# Patient Record
Sex: MALE | Race: WHITE | Employment: FULL TIME | URBAN - METROPOLITAN AREA
[De-identification: names, ages, dates, MRNs, and addresses within clinical notes are randomized per-mention and may not be internally consistent; named-entity substitution may affect disease eponyms.]

---

## 2022-12-06 ENCOUNTER — HOSPITAL ENCOUNTER (OUTPATIENT)
Dept: PHYSICAL THERAPY | Age: 37
Discharge: HOME | End: 2022-12-06
Payer: COMMERCIAL

## 2022-12-06 PROCEDURE — 97162 PT EVAL MOD COMPLEX 30 MIN: CPT

## 2022-12-06 PROCEDURE — 97140 MANUAL THERAPY 1/> REGIONS: CPT

## 2022-12-06 PROCEDURE — 97530 THERAPEUTIC ACTIVITIES: CPT

## 2022-12-06 NOTE — PROGRESS NOTES
[facility]   [address] [facility] [address], [place] [address]  Phone: [phone] Fax: [phone] / STATEMENT OF MEDICAL NECESSITY FOR PHYSICAL THERAPY SERVICES  Patient Name: Nickolas Payan : 1985   Medical   Diagnosis: Other low back pain [M54.59] Treatment Diagnosis: Lumbar derangement   Onset Date: 22     Referral Source: Valdo Nicholson MD [place] [facility]): 2022   Prior Hospitalization: See medical history Provider #: [id number]   Prior Level of Function: 4x severe LBP episodes lasting 2-4 days each; active, lifitng weights, working in [employer]   Comorbidities: scoliosis   Medications: Verified on Patient Summary List   The Plan of Care and following information is based on the information from the initial evaluation.   ========================================================================  Assessment / key information:  Pt is a 43 yo male with another onset of  LBP on 22 when he was picking up a piece of laundry. Pt notes 4x previous episodes of LBP all with flexion/loads with lifting a bench and doing dead lifts. Pain episodes are typically severe, lasting 2-4 days with pt unable to get out of bed and noting sever mm spasms. This episode was 2 days. Treatment in the past has been limited by no insurance. Xrays note Scoliosis (pt with min R rib hump in flexion). Pt presents with  ranging from 2-10/10, Lately pain not as high as 10/10 in the central lumbar spine around L4-5, with intermittent radiations out laterally. Pt denies peripheralization into buttox and legs; denies red flags and denies weakness. Pt has limited extension ROM and PA mobility. AROm lumbar spine is limited in extension and (B) rotation with most rotation attempting to come from the TS. Hip PROM limited in R IR to 15 deg. FlexibilitY: + HS 90/90, + Ely. (-).   Repeated movement testing reveals Decreased pain and increased ROM with extension, sxs consistent with extension bias. (-) Special tests include: SIJ cluster test, CONNOR, SLR (B).  (+) Special tests include: R slump. Functional limitations include: flexion, tall kneeling flexion based activities at work, rotation while on a ladder with fixated feet, sitting > 2 hours, ADLs in the morning (shaving head, brushing teeth).   Pt will benefit from PT interventions to address the aforementioned deficits and allow pt to return to [facility].   ========================================================================  Eval Complexity: History: MEDIUM  Complexity : 1-2 comorbidities / personal factors will impact the outcome/ POC Exam:HIGH Complexity : 4+ Standardized tests and measures addressing body structure, function, activity limitation and / or participation in recreation  Presentation: MEDIUM Complexity : Evolving with changing characteristics  Clinical Decision Making:Other outcome measures FOTO not tested but mod complexity based on PT judgement of pt's dysfunction   MEDIUMOverall Complexity:MEDIUM  Problem List: pain affecting function, decrease ROM, decrease strength, impaired gait/ balance, decrease ADL/ functional abilitiies, decrease activity tolerance, decrease flexibility/ joint mobility, and decrease transfer abilities   Treatment Plan may include any combination of the following: Therapeutic exercise, Neuromuscular reeducation, Manual therapy, Therapeutic activity, Self care/home management, Electric stim unattended , Gait training, and Mechanical traction  Vasopnuematic compression justification:  Per bilateral girth measures taken and listed above the edema is considered significant and having an impact on the patient's strength, balance, gait and transfers  Patient / Family readiness to learn indicated by: asking questions, trying to perform skills, and interest  Persons(s) to be included in education: patient (P)  Barriers to Learning/Limitations: None  Measures taken:    Patient Goal (s): [place] solution that enables me to live my life as I want\"   Patient self reported health status: fair  Rehabilitation Potential: good  Short Term Goals: To be accomplished in  2  treatments:  1. Pt will be independent and compliant with HEP to decrease pain, increase ROM and return pt to PLOF. Status at last assessment: initiated at   Long Term Goals: To be accomplished in  8-12  treatments:  1. Pt will increase score on the FOTO to > or = functional improvement/100 to demo an increase in functional activity tolerance. Status at last assessment: to be assessed NV  2. Pt will be able to self-manage ave pain to < or = 2/10 ave in the lumbar spine to improve ease of posture, mobility and self-care   Status at last assessment: ranges 2-10/10  3. Pt will be maintain pain centralized to the lumbar spine > or = 75% of the time to improve prognosis for recovery. Status at last assessment: radiating laterally often   4. Pt will be able to maintain tall kneeling position for work > or = 30 minutes with no c/o pain to restore PLOF   Status at last assessment: pain with tall kneeling positions at work   5. Pt will have an increase in lumbar extension AROM to > or = 75% in standing to restore end range mobility for maintenance and prevention of pain in future episodes  Status at last: 25% with some end range pain (no worse as result)  Frequency / Duration:   Patient to be seen  2  times per week for 8-12  treatments:  (All LTG as above will be assessed and updated every 10 visits or 30 days and progressed as needed)  Patient / Caregiver education and instruction: self care, activity modification, and exercises  Therapist Signature: Ulysses Menezes DPT Date: 36/2/6591   Certification Period: Na  Time: 8:13 AM   ========================================================================  I certify that the above Physical Therapy Services are being furnished while the patient is under my care.   I agree with the treatment plan and certify that this therapy is necessary. Physician Signature:        Date:       Time:   Dipika Treviño MD  Please sign and return to [facility] or you may fax the signed copy to [phone]. Thank you.

## 2022-12-06 NOTE — PROGRESS NOTES
PT DAILY TREATMENT NOTE     Patient Name: Marisa Silva  Date:2022  : 1985  [x]  Patient  Verified  Payor: Chel Parlor / Plan: [place] / Product Type: Commerical /    In time:9:16  Out time:10:07  Total Treatment Time (min): 51  Total Timed Codes (min): 51  1:1 Treatment Time (min): 51   Visit #: 1 of     Treatment Area: Other low back pain [M54.59]    SUBJECTIVE  Pain Level (0-10 scale):C: 2, B: 2, W: 10  []constant []intermittent [x]improving []worsening []no change since onset  Worsens: bending, pain worse in the evenings after work  Eases: unsure, medication, time    Current symptoms/Complaints:  3 years ago pt notes new onset of back pain when he was cutting the grass. He bent over, lifted a wrought iron bench. Sharp pain for fleeting moment and then severe mm spasms in the lumbar spine through the glutes and HS. Spent 4 days in bed. Treatment: strengthening the core; started lifting, dead lifts 95# to start and progressed to 165#. Noting an episode of pain with dead lifting, the exact same pain as before. Throughout this whole time, pt did not have insurance. [facility] did do imaging and noted scoliosis, which he's had since 4th grade. Pt was able to resume lifting eventually. 3rd episode was deadlifting 145#, 1.5 years ago. 4th episode: bent over while folding laundry. Symptoms were not as severe as the last episodes but have lasted for 2 days. Pt does get pain, no numbness, in the legs during these episodes. Pain radiates out into the glutes.     Mm cramping wakes him at night; he can get cramping in the TS at night after work   Pt notes feeling pretty good when he wakes up but ADLs involving flexion can increase pain  Sittin hours    Subjective functional status/changes:     PLOF: active, weight lifting; no pain management needed prior to first episode  Limitations to PLOF: work activities, weight lifting; bending; soreness and pain after weight lifting. Mechanism of Injury: bending, lifting     Previous Treatment/Compliance: xrays at [facility]; no treatment as pt had no insurance.    PMHx/Surgical Hx: NA  Work Hx: ; must pivot on a ladder with stationary feet; will yield to severe lumbar cramping; tall kneeling at work for several minutes; pt adjusts his positions at work to avoid painful positions; can use 1/2 kneeling positions or supine  Living Situation: not asked today  Pt Goals: \"a solution that enables me to live my life as I want\"  Barriers: []pain []financial []time []transportation []other  Motivation: great to return to his PLOF and weight lifting  Substance use: NV    OBJECTIVE      Vasopnuematic compression justification:  Per bilateral girth measures taken and listed above the edema is considered significant and having an impact on the patient's functional mobility and self-care    NC min Therapeutic Exercise:  [x] See flow sheet :   Rationale: increase ROM and increase strength to improve the patients ability to restore lumbar ROM     8 min Manual Therapy:  PA glides to LS L3-5 in prone, sustained pressure building over 30-45\"; REIL with PT OP    Rationale: decrease pain and increase ROM to reach end range for improved prognosis  The manual therapy interventions were performed at a separate and distinct time from the therapeutic activities interventions  (Na Add 59 Modifier in conjunction with TA treatment)    15 min Therapeutic Activity:  [x]  See flow sheet :    REIL with pt OP  RANJAN  Posture changes every 20-30 min  Centralization  Prognosis  80% LBP returns; managing it in the future  Awareness of stiffness  Avoidance of flexion in first 3 hours of the day    Rationale:  to restore ROM, posture, strength, reduction and prevention of pain         W with TA min Patient Education: [x] Review HEP    [] Progressed/Changed HEP based on:   [] positioning   [] body mechanics   [] transfers   [] heat/ice application Other Objective/Functional Measures:   Physical Therapy Evaluation - Lumbar Spine ([facility])  General Health:  Red Flags Indicated? [] Yes    [x] No  [] Yes [x] No   Recent weight change (If yes, due to dieting?  [] Yes  [] No)            [] Yes [x] No   Weakness in legs during walking (mostly noted w/ walking down the steps, feels that he gets the \"miri legs\")  [] Yes [x] No   Unremitting pain at night  [] Yes [x] No   Abdominal pain or problems (nauease, mostly on the L side where the pain wraps around)  [] Yes [x] No   Rectal bleeding (hemrroids)  [] Yes [x] No   Feet more cold or painful in cold weather  [] Yes [x] No   Blood or pain with urination  [] Yes [x] No   Dysfunction of bowel or bladder (after pee, has had to \"milk the urethra\", intermittent dribbles)  [] Yes [x] No   Recent illness within past 3 weeks (i.e, cold, flu)  [] Yes [x] No   Numbness/tingling in buttock/genitalia region     Functional Status  Prior level of function: indep  Present functional limitations: see above  What position do you sleep in?: SL     OBJECTIVE  Posture:  Lateral Shift:    [] R    [] L      [] +  [x] -   Lordosis:         [] Increased   [x] Decreased   [] WNL  Pelvic symmetry: [x] WNL    in standing and supine       Innominate rotation: none noted; rib hump on R with standing flexion     Gait: guarded, lacking full rotation in the pelvis e     Active Movements: [] N/A   [] Too acute   [] Other:  ROM % AROM % PROM Comments:pain, area   Forward flexion 40-60 To toes    Min R Rib hump   Extension 20-30 25%    Central pain, no worse as result   SB right 20-30 To knee  No change   SB left 20-30 To knee   No change   Rotation right 5-10 50%    most rotation in TS   Rotation left 5-10 50%    most rotation in the TS      Prone R hip IR; 15 deg, L WNL     Repeated Movements   Effects on present pain: produces (CT), abolishes (A), increases (incr), decreases (decr), centralizes (C), peripheral (PH), no effect (NE)    Pre-Test Sx Flexion Repeated Flexion Extension Repeated Extension Repeated SBL Repeated SBR   Sitting                 Standing   NE        Lying      PH of pain      Comments:  Side Glide: NT     Neuro Screen [x] WNL     Sensation: intact to light touch throughout dermatomal pattern   Reflexes: Ankle clonus:   Babinski:      Dural Mobility:  Slump Test:  + R   SLR: L -, R min +  Prone Knee Bend:      [x] R    [x] L    [] +    [x] -      Palpation  [x] Min  [x] Mod  [] Severe    Location: L3-5,   Restrictions in PA mobility at T7-9        Strength    L(0-5) R (0-5) N/T   Hip Flexion (L1,2) 5 5 []    Knee Extension (L3,4) 5 5 []    Ankle Dorsiflexion (L4)   []    Great Toe Extension (L5)   []    Ankle Plantarflexion (S1)   []    Knee Flexion (S1,2) 5 5 []    Gluteus Luiz  4 4  []    Glute med 4+ 4+  []     COMMENTS:      Special Tests  Lumbar:          Lumb.  Compression:   [] Pos  [] Neg                                                                          Lumbar Distraction:     [] Pos  [] Neg                          Quadrant:                   NT             Hip:            Mansi Bennetts:              [x] R    [x] L    [] +    [x] -                            Piriformis:        [x] R    [x] L    [x] +    [] -  minimally +     SIJ cluster test all  (-) to rule out SIj involvement        Deficits:                                Hamstrings 90/90: R: +deg, L:  + deg    Ely + (B)                                                            Global Muscular Weakness:  Plank fwd: NT  Plank side: R:      L:  NV  Bridge: 75%    Other tests/comments:              FOTO: NV              Pain Level (0-10 scale) post treatment: 1    ASSESSMENT/Changes in Function: see IE     Patient will continue to benefit from skilled PT services to modify and progress therapeutic interventions, address functional mobility deficits, address ROM deficits, address strength deficits, analyze and address soft tissue restrictions, analyze and cue movement patterns, analyze and modify body mechanics/ergonomics, assess and modify postural abnormalities, and instruct in home and community integration to attain remaining goals.      [x]  See Plan of Care  []  See progress note/recertification  []  See Discharge Summary         Progress towards goals / Updated goals:  SEE IE FOR GOALS     PLAN  []  Upgrade activities as tolerated     []  Continue plan of care  []  Update interventions per flow sheet       []  Discharge due to:_  [x]  Other:Initiate POC as stated in the IE      Justification for Eval Code Complexity:  Patient History : > 4 episodes of pain; fear avoidance; very flexion based heavy loaded job  Examination see IE  Clinical Presentation: evolving with changing, frequent episodes of pain   Clinical Decision Making : FOTO NT today ; mod complexity based on PT evaluation     Paulette Treviño DPT 12/6/2022  8:13 AM    Future Appointments   Date Time Provider Samaria Escalante   12/6/2022  9:15 AM Augie Bradley, PT [facility]

## 2022-12-08 ENCOUNTER — HOSPITAL ENCOUNTER (OUTPATIENT)
Dept: PHYSICAL THERAPY | Age: 37
End: 2022-12-08
Payer: COMMERCIAL

## 2022-12-13 ENCOUNTER — HOSPITAL ENCOUNTER (OUTPATIENT)
Dept: PHYSICAL THERAPY | Age: 37
Discharge: HOME | End: 2022-12-13
Payer: COMMERCIAL

## 2022-12-13 PROCEDURE — 97014 ELECTRIC STIMULATION THERAPY: CPT

## 2022-12-13 PROCEDURE — 97110 THERAPEUTIC EXERCISES: CPT

## 2022-12-13 PROCEDURE — 97112 NEUROMUSCULAR REEDUCATION: CPT

## 2022-12-13 PROCEDURE — 97530 THERAPEUTIC ACTIVITIES: CPT

## 2022-12-13 NOTE — PROGRESS NOTES
PT DAILY TREATMENT NOTE 8-    Patient Name: Sabrina Hoyos  Date:2022  : 1985  [x]  Patient  Verified  Payor: Nathalie Alvarado / Plan: [place] / Product Type: Commerical /    In time:915  Out time:1017  Total Treatment Time (min): 62  Visit #: 2 of 10      Treatment Area: Other low back pain [M54.59]    SUBJECTIVE  Pain Level (0-10 scale): 4  Any medication changes, allergies to medications, adverse drug reactions, diagnosis change, or new procedure performed?: [x] No    [] Yes (see summary sheet for update)  Subjective functional status/changes:   [] No changes reported  \"Its about the same. I'm used to dealing with the discomfort. Folding laundry bent over, pin prick and 30 minutes later it's worse. \"    OBJECTIVE    Modality rationale: decrease pain and increase tissue extensibility to improve the patients ability to perform ADLs and rest comfortably following therapy.    Min Type Additional Details   10 +2 [x] Estim:  [x]Unatt         [x]IFC  []Premod      []w/ice   []w/heat  Position: prone  Location: l/s paraspinal mm     [] Estim: []Att      []TENS instruct  []NMES                   []w/US   []w/ice   []w/heat  Position:  Location:     []  Traction:   [] Cervical       []Lumbar  [] Prone          []Supine  []Intermittent   []Continuous Lbs:  [] before manual  [] after manual     []  Ultrasound:   []Continuous   [] Pulsed     []1MHz   []3MHz W/cm2:  Location:     []  Iontophoresis with dexamethasone        Location: [] Take home patch  [] In clinic     []  Ice     []  heat  []  Ice massage  []  Laser  []  Anodyne Position:  Location:     []  Vasopneumatic Device    []  Right     []  Left  Pre-treatment girth:  Post-treatment girth:  Measured at (location): Pressure:       [] lo [] med [] hi  Temperature: [] lo [] med [] hi   [x] Skin assessment post-treatment:  [x]intact []redness- no adverse reaction    []redness - adverse reaction    15 min Therapeutic Exercise:  [x] See flow sheet :   Rationale: increase ROM and increase strength to improve trunk strength/mobility to improve ease of ADLs, household chores, and gait. 25 min Therapeutic Activity:  [x]  See flow sheet :   Rationale: increase strength, improve coordination, improve balance, and increase proprioception to improve the patient's ability to perform transfers, stair negotiation, functional overhead reach/lifts, and functional carries. 10 min Neuromuscular Re-education:  [x]  See flow sheet :   Rationale: increase strength, improve coordination, improve balance and increase proprioception to improve the patients ability to perform functional tasks with improved abdominal brace utilization                                                                    Throughout Patient Education: [x] Review HEP    Use of lumbar roll in sitting  2x 30 min prone lying at home  Use of pillows to maintain spinal alignment while lying supine/side-lying  Avoid bending, utilize golfers lift or lunge to  objects         Other Objective/Functional Measures:   Functional movement assessment, interventions performed with demo/cues for technique. Interventions performed to max weight while being able to maintain good l/s ext and avoid incr in sx. Good mornin# x15  Crate lifts: x5 at 42.5, incr pain \"muscular spasm\"  Chop: x10, [place] 30#   Lunge Lift: x5 at 30#    FOTO 48    Pain Level (0-10 scale) post treatment: 0    ASSESSMENT/Changes in Function:   Pt seen for 1st treatment since IE. Functional assessment performed to determine ability to perform work duties. Pt is a poor communicator of incr in pain as he notes pain is a long standing discomfort. He was observed to perform forward flexion throughout treatment today for picking up water bottle, stretching hamstrings, pulling weighted crate closer to himself. Extensive pt edu for positional and postural awarenss to maintain extension.  Pt agreeable to trial of IFC, \"strangely satisfying, my whole backside is numb\". Patient will continue to benefit from skilled PT services to modify and progress therapeutic interventions, address functional mobility deficits, address ROM deficits, address strength deficits, analyze and address soft tissue restrictions, analyze and cue movement patterns, analyze and modify body mechanics/ergonomics, assess and modify postural abnormalities, address imbalance/dizziness, and instruct in home and community integration to attain remaining goals. []  See Plan of Care  []  See progress note/recertification  []  See Discharge Summary         PN due 1/5/23    Progress towards goals / Updated goals:  Short Term Goals: To be accomplished in  2  treatments:  1. Pt will be independent and compliant with HEP to decrease pain, increase ROM and return pt to PLOF. Status at last assessment: initiated at   Long Term Goals: To be accomplished in  8-12  treatments:  1. Pt will increase score on the FOTO to > or = 64/100 to demo an increase in functional activity tolerance. Status at last assessment: 48  2. Pt will be able to self-manage ave pain to < or = 2/10 ave in the lumbar spine to improve ease of posture, mobility and self-care   Status at last assessment: ranges 2-10/10  3. Pt will be maintain pain centralized to the lumbar spine > or = 75% of the time to improve prognosis for recovery. Status at last assessment: radiating laterally often   4. Pt will be able to maintain tall kneeling position for work > or = 30 minutes with no c/o pain to restore PLOF   Status at last assessment: pain with tall kneeling positions at work   5.  Pt will have an increase in lumbar extension AROM to > or = 75% in standing to restore end range mobility for maintenance and prevention of pain in future episodes  Status at last: 25% with some end range pain (no worse as result)    PLAN  [x]  Upgrade activities as tolerated     [x]  Continue plan of care  [] Update interventions per flow sheet       []  Discharge due to:_  []  Other:_       Salvador Orozco, PT 12/13/2022  8:34 AM    Future Appointments   Date Time Provider Department Center   12/13/2022  9:15 AM Brianna Montanez, PT [facility]   12/15/2022  9:15 AM Silvano Parsons, PTA [facility]   12/20/2022  7:45 AM Brianna Montanez, PT [facility]   12/22/2022  7:45 AM Brianna Montanez, PT [facility]   12/27/2022  9:15 AM Silvano Parsons, PTA [facility]   12/29/2022  7:45 AM Kaden Issa [facility]

## 2022-12-15 ENCOUNTER — HOSPITAL ENCOUNTER (OUTPATIENT)
Dept: PHYSICAL THERAPY | Age: 37
Discharge: HOME | End: 2022-12-15
Payer: COMMERCIAL

## 2022-12-15 PROCEDURE — 97112 NEUROMUSCULAR REEDUCATION: CPT

## 2022-12-15 PROCEDURE — 97110 THERAPEUTIC EXERCISES: CPT

## 2022-12-15 PROCEDURE — 97014 ELECTRIC STIMULATION THERAPY: CPT

## 2022-12-15 PROCEDURE — 97530 THERAPEUTIC ACTIVITIES: CPT

## 2022-12-15 NOTE — PROGRESS NOTES
PT DAILY TREATMENT NOTE 8-14    Patient Name: Viola Stephenson  Date:12/15/2022  : 1985  [x]  Patient  Verified  Payor: Johnny Rod / Plan: [employer] / Product Type: Commerical /    In time: 9:22 am        Out time: 10:23 am  Total Treatment Time (min): 61  Visit #: 3 of 10      Treatment Area: Other low back pain [M54.59]    SUBJECTIVE  Pain Level (0-10 scale): 0; discomfort in low back  Any medication changes, allergies to medications, adverse drug reactions, diagnosis change, or new procedure performed?: [x] No    [] Yes (see summary sheet for update)  Subjective functional status/changes:   [] No changes reported  \"I have lived with the pain for so long. \"    OBJECTIVE    Modality rationale: decrease pain and increase tissue extensibility to improve the patients ability to perform ADLs and rest comfortably following therapy.    Min Type Additional Details   10 [x] Estim:  [x]Unatt         [x]IFC  []Premod      []w/ice   []w/heat  Position: prone  Location: l/s paraspinal mm     [] Estim: []Att      []TENS instruct  []NMES                   []w/US   []w/ice   []w/heat  Position:  Location:     []  Traction:   [] Cervical       []Lumbar  [] Prone          []Supine  []Intermittent   []Continuous Lbs:  [] before manual  [] after manual     []  Ultrasound:   []Continuous   [] Pulsed     []1MHz   []3MHz W/cm2:  Location:     []  Iontophoresis with dexamethasone        Location: [] Take home patch  [] In clinic     []  Ice     []  heat  []  Ice massage  []  Laser  []  Anodyne Position:  Location:     []  Vasopneumatic Device    []  Right     []  Left  Pre-treatment girth:  Post-treatment girth:  Measured at (location): Pressure:       [] lo [] med [] hi  Temperature: [] lo [] med [] hi   [x] Skin assessment post-treatment:  [x]intact []redness- no adverse reaction    []redness - adverse reaction    14 min Therapeutic Exercise:  [x] See flow sheet:    Rationale: increase ROM and increase strength to improve trunk strength/mobility to improve ease of ADLs, household chores, and gait. 25 min Therapeutic Activity:  [x]  See flow sheet:    Rationale: increase strength, improve coordination, improve balance, and increase proprioception to improve the patient's ability to perform transfers, stair negotiation, functional overhead reach/lifts, and functional carries. 12 min Neuromuscular Re-education:  [x]  See flow sheet: added modified plank (forward on forearms/knees) and laterally (on forearms/knees); added birddogs (performed non-alternating)   Rationale: increase strength, improve coordination, improve balance and increase proprioception to improve the patients ability to perform functional tasks with improved abdominal brace utilization                                                                    throughout treatment Patient Education: [x] Review HEP         Other Objective/Functional Measures:   Good mornin# x15  Crate lifts: 2x5 at 30#, incr pain \"muscular spasm\"    Lunge Lift: x5 at 30#  L/S extension AROM: 50% with end-range low back discomfort/stiffness    Pain Level (0-10 scale) post treatment: 0    ASSESSMENT/Changes in Function:   Patient notes pain reduction following last sessions, expressing intent to reinitiate IFC during today's treatment due to positive benefits it provided at last session. Patient demonstrates difficulty avoiding forceful lumbar extension during crate lifts, benefiting from cueing for hip hinge technique to improve safety and efficiency.     Patient will continue to benefit from skilled PT services to modify and progress therapeutic interventions, address functional mobility deficits, address ROM deficits, address strength deficits, analyze and address soft tissue restrictions, analyze and cue movement patterns, analyze and modify body mechanics/ergonomics, assess and modify postural abnormalities, address imbalance/dizziness, and instruct in home and community integration to attain remaining goals. []  See Plan of Care  []  See progress note/recertification  []  See Discharge Summary         PN due 1/5/23    Progress towards goals / Updated goals:  Short Term Goals: To be accomplished in  2  treatments:  1. Pt will be independent and compliant with HEP to decrease pain, increase ROM and return pt to PLOF. Status at last assessment: initiated at   Long Term Goals: To be accomplished in  8-12  treatments:  1. Pt will increase score on the FOTO to > or = 64/100 to demo an increase in functional activity tolerance. Status at last assessment: 48  2. Pt will be able to self-manage ave pain to < or = 2/10 ave in the lumbar spine to improve ease of posture, mobility and self-care   Status at last assessment: ranges 2-10/10  3. Pt will be maintain pain centralized to the lumbar spine > or = 75% of the time to improve prognosis for recovery. Status at last assessment: radiating laterally often   4. Pt will be able to maintain tall kneeling position for work > or = 30 minutes with no c/o pain to restore PLOF   Status at last assessment: pain with tall kneeling positions at work   5.  Pt will have an increase in lumbar extension AROM to > or = 75% in standing to restore end range mobility for maintenance and prevention of pain in future episodes  Status at last: 25% with some end range pain (no worse as result)  Current: goal progressing; 50% (12/15/22)    PLAN  [x]  Upgrade activities as tolerated     [x]  Continue plan of care  []  Update interventions per flow sheet       []  Discharge due to:_  [x]  Other:_       Noemi Womack PTA 12/15/2022    Future Appointments   Date Time Provider Samaria Escalante   12/20/2022  7:45 AM Abhinav Salinas, PT [facility]   12/22/2022  7:45 AM Abhinav Salinas PT [facility]   12/27/2022  9:15 AM Guevara Tinoco PTA [facility]   12/29/2022  7:45 AM Flores Portillo [facility]

## 2022-12-20 ENCOUNTER — APPOINTMENT (OUTPATIENT)
Dept: PHYSICAL THERAPY | Age: 37
End: 2022-12-20
Payer: COMMERCIAL

## 2022-12-22 ENCOUNTER — HOSPITAL ENCOUNTER (OUTPATIENT)
Dept: PHYSICAL THERAPY | Age: 37
Discharge: HOME | End: 2022-12-22
Payer: COMMERCIAL

## 2022-12-22 PROCEDURE — 97014 ELECTRIC STIMULATION THERAPY: CPT

## 2022-12-22 PROCEDURE — 97110 THERAPEUTIC EXERCISES: CPT

## 2022-12-22 PROCEDURE — 97112 NEUROMUSCULAR REEDUCATION: CPT

## 2022-12-22 NOTE — PROGRESS NOTES
PT DAILY TREATMENT NOTE 8-14    Patient Name: Elaine Nj  Date:2022  : 1985  [x]  Patient  Verified  Payor: Lowell Kiley / [facility] / Product Type: Commerical /    In time: 7:45       Out time: 848  Total Treatment Time (min): 63  Visit #: 4 of 10      Treatment Area: Other low back pain [M54.59]    SUBJECTIVE  Pain Level (0-10 scale): \"just aches\"  Any medication changes, allergies to medications, adverse drug reactions, diagnosis change, or new procedure performed?: [x] No    [] Yes (see summary sheet for update)  Subjective functional status/changes:   [x] No changes reported  \"I slept for too long. \" Pt notes he has been on light duty, \"yes and no\" on whether it is helping his back, \"result is still the same at the end of the day. \" Pt reports he is doing the stretches more often. OBJECTIVE    Modality rationale: decrease pain and increase tissue extensibility to improve the patients ability to perform ADLs and rest comfortably following therapy.    Min Type Additional Details   15 [x] Estim:  [x]Unatt         [x]IFC  []Premod      []w/ice   []w/heat  Position: prone  Location: l/s paraspinal mm     [] Estim: []Att      []TENS instruct  []NMES                   []w/US   []w/ice   []w/heat  Position:  Location:     []  Traction:   [] Cervical       []Lumbar  [] Prone          []Supine  []Intermittent   []Continuous Lbs:  [] before manual  [] after manual     []  Ultrasound:   []Continuous   [] Pulsed     []1MHz   []3MHz W/cm2:  Location:     []  Iontophoresis with dexamethasone        Location: [] Take home patch  [] In clinic     []  Ice     []  heat  []  Ice massage  []  Laser  []  Anodyne Position:  Location:     []  Vasopneumatic Device    []  Right     []  Left  Pre-treatment girth:  Post-treatment girth:  Measured at (location): Pressure:       [] lo [] med [] hi  Temperature: [] lo [] med [] hi   [x] Skin assessment post-treatment:  [x]intact []redness- no adverse reaction    []redness - adverse reaction    25 min Therapeutic Exercise:  [x] See flow sheet:    Rationale: increase ROM and increase strength to improve trunk strength/mobility to improve ease of ADLs, household chores, and gait. x min Therapeutic Activity:  [x]  See flow sheet:    Rationale: increase strength, improve coordination, improve balance, and increase proprioception to improve the patient's ability to perform transfers, stair negotiation, functional overhead reach/lifts, and functional carries. 23 min Neuromuscular Re-education:  [x]  See flow sheet:    Rationale: increase strength, improve coordination, improve balance and increase proprioception to improve the patients ability to perform functional tasks with improved abdominal brace utilization                                                                    throughout treatment Patient Education: [x] Review HEP  Perform l/s ext and prone lying in the morning after waking   Updated HEP with strengthening, plan to provide NV if pain is not incr with performing in gym         Other Objective/Functional Measures:       Pain Level (0-10 scale) post treatment: 0 \"awesome\"    ASSESSMENT/Changes in Function:   Patient notes \"burning\" typical pain at l/s following incr reps of both swimmers and bird dogs, improved with rest in prone. Good mornings incr pain by rep 8 to 8/10, ceased performing and initiated standing cane t/s rotation, sx decr only to 6/10. Remaining interventions held and e-stim time incr to 15 min. Z[employer]nex E-stim unit ordered for home use d/t patient reporting much decr sx with use in PT. Plan to cont to assess ext-bias and source of pain. Pt demos much improved lifting techniques throughout session in gym.     Patient will continue to benefit from skilled PT services to modify and progress therapeutic interventions, address functional mobility deficits, address ROM deficits, address strength deficits, analyze and address soft tissue restrictions, analyze and cue movement patterns, analyze and modify body mechanics/ergonomics, assess and modify postural abnormalities, address imbalance/dizziness, and instruct in home and community integration to attain remaining goals. []  See Plan of Care  []  See progress note/recertification  []  See Discharge Summary         PN due 1/5/23    Progress towards goals / Updated goals:  Short Term Goals: To be accomplished in  2  treatments:  1. Pt will be independent and compliant with HEP to decrease pain, increase ROM and return pt to PLOF. Status at last assessment: initiated at   Current 12/22/22 pt reports improved postural awareness and performance of stretches with onset of back pain  Long Term Goals: To be accomplished in  8-12  treatments:  1. Pt will increase score on the FOTO to > or = 64/100 to demo an increase in functional activity tolerance. Status at last assessment: 48  2. Pt will be able to self-manage ave pain to < or = 2/10 ave in the lumbar spine to improve ease of posture, mobility and self-care   Status at last assessment: ranges 2-10/10  3. Pt will be maintain pain centralized to the lumbar spine > or = 75% of the time to improve prognosis for recovery. Status at last assessment: radiating laterally often   4. Pt will be able to maintain tall kneeling position for work > or = 30 minutes with no c/o pain to restore PLOF   Status at last assessment: pain with tall kneeling positions at work   5.  Pt will have an increase in lumbar extension AROM to > or = 75% in standing to restore end range mobility for maintenance and prevention of pain in future episodes  Status at last: 25% with some end range pain (no worse as result)  Current: goal progressing; 50% (12/15/22)    PLAN  [x]  Upgrade activities as tolerated     [x]  Continue plan of care  []  Update interventions per flow sheet       []  Discharge due to:_  [x]  Other:_   provide updated HEP and practice in gym    Cesar Jameson, PT 12/22/2022    Future Appointments   Date Time Provider Samaria Escalante   12/27/2022  9:15 AM Poppy Alas [facility]   12/29/2022  7[facility]45 [facility]

## 2022-12-27 ENCOUNTER — HOSPITAL ENCOUNTER (OUTPATIENT)
Dept: PHYSICAL THERAPY | Age: 37
Discharge: HOME | End: 2022-12-27
Payer: COMMERCIAL

## 2022-12-27 PROCEDURE — 97140 MANUAL THERAPY 1/> REGIONS: CPT

## 2022-12-27 PROCEDURE — 97014 ELECTRIC STIMULATION THERAPY: CPT

## 2022-12-27 PROCEDURE — 97110 THERAPEUTIC EXERCISES: CPT

## 2022-12-27 PROCEDURE — 97112 NEUROMUSCULAR REEDUCATION: CPT

## 2022-12-27 NOTE — PROGRESS NOTES
PT DAILY TREATMENT NOTE 8-14    Patient Name: Marisa Silva  Date:2022  : 1985  [x]  Patient  Verified  Payor: Chel Hartley / Plan: [employer] / Product Type: Commerical /    In time: 9:25 am            Out time: 10:31 am  Total Treatment Time (min): 66  Visit #: 5 of 10      Treatment Area: Other low back pain [M54.59]    SUBJECTIVE  Pain Level (0-10 scale): 3  Any medication changes, allergies to medications, adverse drug reactions, diagnosis change, or new procedure performed?: [x] No    [] Yes (see summary sheet for update)  Subjective functional status/changes:   [x] No changes reported  Patient notes lifting 55 inch TV with his father over the weekend as he attached it to the wall. He reports it caused some stiffness in his back, which went away over time. OBJECTIVE    Modality rationale: decrease pain and increase tissue extensibility to improve the patients ability to perform ADLs and rest comfortably following therapy. Min Type Additional Details   10 [x] Estim:  [x]Unatt                       [x]IFC        []w/ice   [x]w/heat  Position: prone  Location: l/s paraspinal mm     []  Ice     []  heat Position:  Location:     []  Vasopneumatic Device    []  Right     []  Left  Pre-treatment girth:  Post-treatment girth:  Measured at (location): Pressure:       [] lo [] med [] hi  Temperature: [] lo [] med [] hi   [x] Skin assessment post-treatment:  [x]intact []redness- no adverse reaction    []redness - adverse reaction    33 min Therapeutic Exercise:  [x] See flow sheet:   Rationale: increase ROM and increase strength to improve trunk strength/mobility to improve ease of ADLs, household chores, and gait.      15 min Neuromuscular Re-education:  [x]  See flow sheet: added wall sits to 60 degree knee flexion   Rationale: increase strength, improve coordination, improve balance and increase proprioception to improve the patients ability to perform functional tasks with improved abdominal brace utilization     8 min Manual Therapy:  prone massage gun STM to (B) LPS and glutes   Rationale: decrease pain, increase ROM, and increase tissue extensibility to improve the patients ability to perform ADLs. The manual therapy interventions were performed at a separate and distinct time from the therapeutic activities interventions. throughout treatment Patient Education: [x] Review HEP - open books (verbally)         Other Objective/Functional Measures: Active Movements:  ROM % AROM % PROM Comments:pain, area   Forward flexion 40-60 To toes    Poor reversal   Extension 20-30 75%   Central pain, no worse as result   SB right 20-30 Fingertips to knees      SB left 20-30 Fingertips to knees        Pain Level (0-10 scale) post treatment: 0    ASSESSMENT/Changes in Function:   Patient with excellent response to open books to reduce lumbar spine pain as he admits multiple cavitations in the low spine and demonstrated improved spinal rotation thereafter. 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 Patient will continue to benefit from skilled PT services to modify and progress therapeutic interventions, address functional mobility deficits, address ROM deficits, address strength deficits, analyze and address soft tissue restrictions, analyze and cue movement patterns, analyze and modify body mechanics/ergonomics, assess and modify postural abnormalities, address imbalance/dizziness, and instruct in home and community integration to attain remaining goals. []  See Plan of Care  []  See progress note/recertification  []  See Discharge Summary  PN due 1/5/23    Progress towards goals / Updated goals:  Short Term Goals: To be accomplished in  2  treatments:  1.   Pt will be independent and compliant with HEP to decrease pain, increase ROM and return pt to PLOF.    Status at last assessment: initiated at   Current 12/22/22 pt reports improved postural awareness and performance of stretches with onset of back pain  Long Term Goals: To be accomplished in  8-12  treatments:  1. Pt will increase score on the FOTO to > or = 64/100 to demo an increase in functional activity tolerance. Status at last assessment: 48  2. Pt will be able to self-manage ave pain to < or = 2/10 ave in the lumbar spine to improve ease of posture, mobility and self-care   Status at last assessment: ranges 2-10/10  3. Pt will be maintain pain centralized to the lumbar spine > or = 75% of the time to improve prognosis for recovery. Status at last assessment: radiating laterally often   4. Pt will be able to maintain tall kneeling position for work > or = 30 minutes with no c/o pain to restore PLOF   Status at last assessment: pain with tall kneeling positions at work   5.  Pt will have an increase in lumbar extension AROM to > or = 75% in standing to restore end range mobility for maintenance and prevention of pain in future episodes  Status at last: 25% with some end range pain (no worse as result)  Current: goal progressing; 75% (12/27/22)    PLAN  [x]  Upgrade activities as tolerated     [x]  Continue plan of care  []  Update interventions per flow sheet       []  Discharge due to:_  [x]  Other: assess response to treatment    Faina Oropeza PTA 12/27/2022    Future Appointments   Date Time Provider Samaria Escalante   12/27/2022  9:15 AM Bandar [facility]   12/29/2022  7:[facility]

## 2023-01-03 ENCOUNTER — HOSPITAL ENCOUNTER (OUTPATIENT)
Dept: PHYSICAL THERAPY | Age: 38
Discharge: HOME | End: 2023-01-03
Payer: COMMERCIAL

## 2023-01-03 PROCEDURE — 97014 ELECTRIC STIMULATION THERAPY: CPT

## 2023-01-03 PROCEDURE — 97140 MANUAL THERAPY 1/> REGIONS: CPT

## 2023-01-03 PROCEDURE — 97112 NEUROMUSCULAR REEDUCATION: CPT

## 2023-01-03 PROCEDURE — 97110 THERAPEUTIC EXERCISES: CPT

## 2023-01-03 NOTE — PROGRESS NOTES
PT DAILY TREATMENT NOTE     Patient Name: Angela Best  Date:1/3/2023  : 1985  [x]  Patient  Verified  Payor: Niki Moeller / Plan: [employer] / Product Type: Commerical /    In time:2:00  Out time:3:08  Total Treatment Time (min): 68  Visit #: 6 of 8-10    Medicare/BCBS Only   Total Timed Codes (min):   1:1 Treatment Time:         Treatment Area: Other low back pain [M54.59]    SUBJECTIVE  Pain Level (0-10 scale): 0  Any medication changes, allergies to medications, adverse drug reactions, diagnosis change, or new procedure performed?: [x] No    [] Yes (see summary sheet for update)  Subjective functional status/changes:   [] No changes reported  \"No pain today, but just really stiff. \"     OBBJECTIVE    Modality rationale: decrease pain and increase tissue extensibility to improve the patients ability to perform ADLs and rest comfortably following therapy.     Min Type Additional Details   15 + 2 [x] Estim:  [x]Unatt       [x]IFC  []Premod                        []Other:  []w/ice   [x]w/heat  Position: prone  Location: low back    [] Estim: []Att    []TENS instruct  []NMES                    []Other:  []w/US   []w/ice   []w/heat  Position:   Location:     []  Traction: [] Cervical       []Lumbar                       [] Prone          []Supine                       []Intermittent   []Continuous Lbs:  [] before manual  [] after manual    []  Ultrasound: []Continuous   [] Pulsed                           []1MHz   []3MHz W/cm2:  Location:    []  Iontophoresis with dexamethasone         Location: [] Take home patch   [] In clinic    []  Ice     []  heat  []  Ice massage  []  Laser   []  Anodyne Position:   Location:     []  Laser with stim  []  Other:  Position:  Location:    []  Vasopneumatic Device    []  Right     []  Left  Pre-treatment girth:  Post-treatment girth:  Measured at (location):  Pressure:       [] lo [] med [] hi   Temperature: [] lo [] med [] hi   [x] Skin assessment post-treatment:  [x]intact []redness- no adverse reaction    []redness - adverse reaction:     31 min Therapeutic Exercise:  [x] See flow sheet :   Rationale: increase ROM and increase strength to improve LE strength/mobility and  lumbar mobility to improve ease of ADLs and gait. 10 min Neuromuscular Re-education:  [x]  See flow sheet :   Rationale: increase strength, improve coordination, improve balance and increase proprioception  to improve the patients ability to perform functional tasks with improved abdominal brace utilization, improved control, and decreased risk for falls. 10 min Manual Therapy:  prone PAIVMs to lumbar spine, L3-S1 segments, performed both centrally and unilaterally to both left/right aspect of lumbar segments listed above   The manual therapy interventions were performed at a separate and distinct time from the therapeutic activities interventions. Rationale: decrease pain, increase ROM, increase tissue extensibility, decrease trigger points, and increase postural awareness to improve the patient's ability to complete standing ADLs with improved tolerance. With   [] TE   [] TA   [] neuro   [] other: Patient Education: [x] Review HEP    [] Progressed/Changed HEP based on:   [] positioning   [] body mechanics   [] transfers   [] heat/ice application    [] other:      Other Objective/Functional Measures: -Added side glides/prayer stretch x 3  -Poor lumbar stretch appreciated with quadruped rock back     Pain Level (0-10 scale) post treatment: 3    ASSESSMENT/Changes in Function: Patient reports chief complaint at beginning of today's session is cont stiffness observed in low back. He presents to clinic today with limited lumbar rotation observed during gait and poor ability to reach forward to lower items to floor. Added unilateral PAIVMS and side glides to improve tolerance for lumbar rotation during gait with minimal carryover at end of session.  Pt does appreciated increased pain with lumbar PAIVMs. Patient will continue to benefit from skilled PT services to modify and progress therapeutic interventions, address functional mobility deficits, address ROM deficits, address strength deficits, analyze and address soft tissue restrictions, analyze and cue movement patterns, analyze and modify body mechanics/ergonomics, assess and modify postural abnormalities and address imbalance/dizziness to attain remaining goals. []  See Plan of Care  []  See progress note/recertification  []  See Discharge Summary         Progress towards goals / Updated goals:  Short Term Goals: To be accomplished in  2  treatments:  1. Pt will be independent and compliant with HEP to decrease pain, increase ROM and return pt to PLOF. Status at last assessment: initiated at   Current 12/22/22 pt reports improved postural awareness and performance of stretches with onset of back pain  Long Term Goals: To be accomplished in  8-12  treatments:  1. Pt will increase score on the FOTO to > or = 64/100 to demo an increase in functional activity tolerance. Status at last assessment: 48  2. Pt will be able to self-manage ave pain to < or = 2/10 ave in the lumbar spine to improve ease of posture, mobility and self-care   Status at last assessment: ranges 2-10/10  3. Pt will be maintain pain centralized to the lumbar spine > or = 75% of the time to improve prognosis for recovery. Status at last assessment: radiating laterally often   Current: reports most low back pain/stiffness located at central lumbar spine; denies frequent symptoms into either LE (1/3/22)  4. Pt will be able to maintain tall kneeling position for work > or = 30 minutes with no c/o pain to restore PLOF   Status at last assessment: pain with tall kneeling positions at work   5.  Pt will have an increase in lumbar extension AROM to > or = 75% in standing to restore end range mobility for maintenance and prevention of pain in future episodes  Status at last: 25% with some end range pain (no worse as result)  Current: goal progressing; 75% (12/27/22)    PLAN  [x]  Upgrade activities as tolerated     [x]  Continue plan of care  []  Update interventions per flow sheet       []  Discharge due to:_  [x]  Other: Please assess response to lumbar PAIVMs at today's session      Rafael Gomez 1/3/2023  10:53 AM    Future Appointments   Date Time Provider Samaria Escalante   1/3/2023  2:00 PM Sabrina Plasencia [facility]   1/5/2023  5:00 PM Nina Brown, PT [facility]   1/10/2023  5:00 PM Sabrina Plasencia [facility]   1/12/2023  5:00 PM [address] [place] [address] [facility]   1/17/2023  5:00 PM Nina Brown PT [facility]   1/19/2023  5:00 PM Nina Brown PT [facility]   1/24/2023  5:00 PM Nina Brown, PT [facility]   1/26/2023  5:00 PM Nina Brown PT [facility]

## 2023-01-05 ENCOUNTER — HOSPITAL ENCOUNTER (OUTPATIENT)
Dept: PHYSICAL THERAPY | Age: 38
Discharge: HOME | End: 2023-01-05
Payer: COMMERCIAL

## 2023-01-05 PROCEDURE — 97014 ELECTRIC STIMULATION THERAPY: CPT

## 2023-01-05 PROCEDURE — 97112 NEUROMUSCULAR REEDUCATION: CPT

## 2023-01-05 PROCEDURE — 97110 THERAPEUTIC EXERCISES: CPT

## 2023-01-05 NOTE — PROGRESS NOTES
[facility] AT [address] Morales [address]  Phone: [phone] Fax: [phone]  PROGRESS NOTE  Patient Name: Sharmin Felix : 1985   Treatment/Medical Diagnosis: Other low back pain [M54.59]   Referral Source: Tomasa Garcia MD     Date of Initial Visit: 22 Attended Visits: 7 Missed Visits: 2     SUMMARY OF TREATMENT  Pt is a 41 yo male with another onset of  LBP on 22 when he was picking up a piece of laundry. Pt notes 4x previous episodes of LBP all with flexion/loads with lifting a bench and doing dead lifts. Pain episodes are typically severe, lasting 2-4 days with pt unable to get out of bed and noting sever mm spasms. Treatment has consisted of the following: Therapeutic exercise, Therapeutic activities, Neuromuscular re-education, E-stim, Manual therapy, Patient education, Functional mobility training, and Self Care training. CURRENT STATUS  Pt has attended 7 treatments including initial evaluation on 22 where he is making steady progress with alleviation of radicular sx, improved kinesthetic awareness, BLE flexibility, l/s AROM and strength however notes cont instances of sharp/burning pain localized to l/s and decr tolerance to work duties requiring bending, lifting, and twisting. He notes good improvement with use of E-stim and has received a home unit for pain modulation.  Further assessment as follows:    Subjective  Pain: Best: 0/10 Worst: 10/10 Av-3/10  Improvements: Kinesthetic and postural awareness   Deficits: cont burning pain with bending, decr tolerance to work duties    Objective/Functional Measures:   Posture: incr lordosis, flattened kyphosis  Active Movements:   ROM % AROM Comments:pain, area   Forward flexion  To toes  Min R Rib hump, decr lordosis reversal   Extension  100%  No pain   SB right To knee No change   SB left To knee No change   Rotation right 100%     Rotation left  100% Repeated Movements Extension-biased for sx relief   Dural Mobility:  Slump Test:  (-) B  SLR: (-) B     Palpation: TTP at L3-5 spinous processes and paraspinal mm, pain with restrictions in PA mobility at T7-10     Strength  Glute Max: B: 4/5, pain at l/s decr with stabilizing PA glide  Hip Ext: R: 4+/5, L: 4/5  Glute Med: NT    Special Tests       Hip:            Piriformis:        [x] R    [x] L    [] +    [x] -         Deficits:         Hamstrings 90/90:(-) B  Ely (-) B                                                           Global Muscular Weakness:  Bridge: 100%  Side plank: R: 47 sec, L: 40 sec     Progress towards goals / Updated goals:  Short Term Goals: To be accomplished in  2  treatments:  1. Pt will be independent and compliant with HEP to decrease pain, increase ROM and return pt to PLOF. Status at last assessment: initiated at IE  Current 1/5/23 goal met, pt notes good compliance  Long Term Goals: To be accomplished in  8-12  treatments:  1. Pt will increase score on the FOTO to > or = 64/100 to demo an increase in functional activity tolerance. Status at last assessment: 48  Current 1/5/23 goal progressing 58  2. Pt will be able to self-manage ave pain to < or = 2/10 ave in the lumbar spine to improve ease of posture, mobility and self-care   Status at last assessment: ranges 2-10/10  Current 1/5/23 goal progressing, ranges 0-10/10, average of 2-3/10  3. Pt will be maintain pain centralized to the lumbar spine > or = 75% of the time to improve prognosis for recovery. Status at last assessment: radiating laterally often   Current 1/5/23 goal met, pt reports no radiating pain recently  4. Pt will be able to maintain tall kneeling position for work > or = 30 minutes with no c/o pain to restore PLOF   Status at last assessment: pain with tall kneeling positions at work   Current 1/5/23 goal progressing, able to maintain for 5 mins before needing to stand and stretch  5.  Pt will have an increase in lumbar extension AROM to > or = 75% in standing to restore end range mobility for maintenance and prevention of pain in future episodes  Status at last: 25% with some end range pain (no worse as result)  Current 1/5/23 goal met, 100% lumbar ext AROM without pain    New Goals to be achieved in __4__  weeks:  1. Pt will increase score on the FOTO to > or = 64/100 to demo an increase in functional activity tolerance. Status at last assessment: 58  2. Pt will be able to self-manage ave pain to < or = 2/10 ave in the lumbar spine to improve ease of posture, mobility and self-care   Status at last assessment: ranges 0-10/10, average of 2-3/10  3. Pt will be able to maintain tall kneeling position for work > or = 30 minutes with no c/o pain to restore PLOF   Status at last assessment: able to maintain for 5 mins before needing to stand and stretch  4. Pt to demo glute max strength of 5/5 B without pain or l/s compensation for improved ease with squatting and climbing. Status at last assessment: B: 4/5, pain at l/s decr with stabilizing PA glide    RECOMMENDATIONS  Recommend cont skilled PT at 2x/weekly for 4 weeks to address remaining deficits, achieve stated goals, and progress to PLOF. If you have any questions/comments please contact us directly at [phone]   Thank you for allowing us to assist in the care of your patient. Therapist Signature: Sudhakar Nascimento PT Date: 1/5/2023   Reporting Period  12/6/22 - 1/5/23 Time: 10:14 AM   NOTE TO PHYSICIAN:  PLEASE COMPLETE THE ORDERS BELOW AND FAX TO   [facility]: [phone].   If you are unable to process this request in 24 hours please contact our office: [phone].  ___ I have read the above report and request that my patient continue as recommended.   ___ I have read the above report and request that my patient continue therapy with the following changes/special instructions:_________________________________________________________   ___ I have read the above report and request that my patient be discharged from therapy.      Physician Signature:        Date:       Time:                                                        Millicent Ricks MD.

## 2023-01-05 NOTE — PROGRESS NOTES
PT DAILY TREATMENT NOTE     Patient Name: Kamini Cota  Date:2023  : 1985  [x]  Patient  Verified  Payor: Yu Coad / Plan: [employer] / Product Type: Commerical /    In time: 170  Out time:180  Total Treatment Time (min): 61  Visit #: 7 of 8-10    Treatment Area: Other low back pain [M54.59]    SUBJECTIVE  Pain Level (0-10 scale): 3-4  Any medication changes, allergies to medications, adverse drug reactions, diagnosis change, or new procedure performed?: [x] No    [] Yes (see summary sheet for update)  Subjective functional status/changes:   [x] No changes reported  \"Feels like groundhog day, I do these things they feel good when I do them, then a couple hours go by and its like I didn't do anything. \"    OBBJECTIVE    Modality rationale: decrease pain and increase tissue extensibility to improve the patients ability to perform ADLs and rest comfortably following therapy.     Min Type Additional Details   15 +1 [x] Estim:  [x]Unatt       [x]IFC  []Premod                        []Other:  []w/ice   [x]w/heat  Position: prone  Location: low back    [] Estim: []Att    []TENS instruct  []NMES                    []Other:  []w/US   []w/ice   []w/heat  Position:   Location:     []  Traction: [] Cervical       []Lumbar                       [] Prone          []Supine                       []Intermittent   []Continuous Lbs:  [] before manual  [] after manual    []  Ultrasound: []Continuous   [] Pulsed                           []1MHz   []3MHz W/cm2:  Location:    []  Iontophoresis with dexamethasone         Location: [] Take home patch   [] In clinic    []  Ice     []  heat  []  Ice massage  []  Laser   []  Anodyne Position:   Location:     []  Laser with stim  []  Other:  Position:  Location:    []  Vasopneumatic Device    []  Right     []  Left  Pre-treatment girth:  Post-treatment girth:  Measured at (location):  Pressure:       [] lo [] med [] hi   Temperature: [] lo [] med [] hi   [x] Skin assessment post-treatment:  [x]intact []redness- no adverse reaction    []redness - adverse reaction:     30 min Therapeutic Exercise:  [x] See flow sheet :  Reassessment   Rationale: increase ROM and increase strength to improve LE strength/mobility and  lumbar mobility to improve ease of ADLs and gait. 15 min Neuromuscular Re-education:  [x]  See flow sheet :  Reassessment   Rationale: increase strength, improve coordination, improve balance and increase proprioception  to improve the patients ability to perform functional tasks with improved abdominal brace utilization, improved control, and decreased risk for falls. TC min Manual Therapy:  prone PAIVMs to lumbar spine, L3-S1 segments, performed both centrally and unilaterally to both left/right aspect of lumbar segments listed above   The manual therapy interventions were performed at a separate and distinct time from the therapeutic activities interventions.   Rationale: decrease pain, increase ROM, increase tissue extensibility, decrease trigger points, and increase postural awareness to improve the patient's ability to complete standing ADLs with improved tolerance          Through out min Patient Education: [x] Review HEP   Reassessment findings  Pt progress made toward goals  Pt goals for cont PT, intent of therapy       Subjective  Pain: Best: 0/10 Worst: 10/10 Av-3/10  Improvements: Kinesthetic and postural awareness   Deficits: cont burning pain with bending, decr tolerance to work duties         Other Objective/Functional Measures:     Posture: incr lordosis, flattened kyphosis  Active Movements:   ROM % AROM Comments:pain, area   Forward flexion  To toes  Min R Rib hump, decr lordosis reversal   Extension  100%  No pain   SB right To knee No change   SB left To knee No change   Rotation right 100%     Rotation left  100%        Repeated Movements Extension-biased for sx relief   Dural Mobility:  Slump Test:  (-) B  SLR: (-) B     Palpation: TTP at L3-5 spinous processes and paraspinal mm, pain with restrictions in PA mobility at T7-10     Strength  Glute Max: B: 4/5, pain at l/s decr with stabilizing PA glide  Hip Ext: R: 4+/5, L: 4/5  Glute Med: NT    Special Tests       Hip:            Piriformis:        [x] R    [x] L    [] +    [x] -         Deficits:         Hamstrings 90/90:(-) B  Ely (-) B                                                           Global Muscular Weakness:  Bridge: 100%  Side plank: R: 47 sec, L: 40 sec     Other tests/comments:              FOTO: 58    Pain Level (0-10 scale) post treatment: 0    ASSESSMENT/Changes in Function:   Please see PN    Patient will continue to benefit from skilled PT services to modify and progress therapeutic interventions, address functional mobility deficits, address ROM deficits, address strength deficits, analyze and address soft tissue restrictions, analyze and cue movement patterns, analyze and modify body mechanics/ergonomics, assess and modify postural abnormalities and address imbalance/dizziness to attain remaining goals. []  See Plan of Care  []  See progress note/recertification  []  See Discharge Summary         Progress towards goals / Updated goals:  Short Term Goals: To be accomplished in  2  treatments:  1. Pt will be independent and compliant with HEP to decrease pain, increase ROM and return pt to PLOF. Status at last assessment: initiated at IE  Current 1/5/23 goal met, pt notes good compliance  Long Term Goals: To be accomplished in  8-12  treatments:  1. Pt will increase score on the FOTO to > or = 64/100 to demo an increase in functional activity tolerance. Status at last assessment: 48  Current 1/5/23 goal progressing 58  2.  Pt will be able to self-manage ave pain to < or = 2/10 ave in the lumbar spine to improve ease of posture, mobility and self-care   Status at last assessment: ranges 2-10/10  Current 1/5/23 goal progressing, ranges 0-10/10, average of 2-3/10  3. Pt will be maintain pain centralized to the lumbar spine > or = 75% of the time to improve prognosis for recovery. Status at last assessment: radiating laterally often   Current 1/5/23 goal met, pt reports no radiating pain recently  4. Pt will be able to maintain tall kneeling position for work > or = 30 minutes with no c/o pain to restore PLOF   Status at last assessment: pain with tall kneeling positions at work   Current 1/5/23 goal progressing, able to maintain for 5 mins before needing to stand and stretch  5.  Pt will have an increase in lumbar extension AROM to > or = 75% in standing to restore end range mobility for maintenance and prevention of pain in future episodes  Status at last: 25% with some end range pain (no worse as result)  Current 1/5/23 goal met, 100% lumbar ext AROM without pain    PLAN  [x]  Upgrade activities as tolerated     [x]  Continue plan of care  []  Update interventions per flow sheet       []  Discharge due to:_  [x]  Other: Cont 2x/week for 4 weeks    Alberto Mabry, PT 1/5/2023  10:53 AM    Future Appointments   Date Time Provider Samaria Escalante   1/5/2023  5:00 PM Helen Wallace PT [facility]   1/10/2023  5:00 PM Eb Keys [facility]   1/12/2023  5:00 PM [facility] [place] 1 [facility]   1/17/2023  5:00 PM Helen Wallace PT [facility]   1/19/2023  5:00 PM Helen Wallace PT [facility]   1/24/2023  5:00 PM Helen Wallace PT [facility]   1/26/2023  5:00 PM Helen Wallace PT [facility]

## 2023-01-10 ENCOUNTER — APPOINTMENT (OUTPATIENT)
Dept: PHYSICAL THERAPY | Age: 38
End: 2023-01-10
Payer: COMMERCIAL

## 2023-01-10 NOTE — PROGRESS NOTES
PT DAILY TREATMENT NOTE     Patient Name: Nickolas Began  Date:1/10/2023  : 1985  [x]  Patient  Verified  Payor: Steven Castro / Plan: [place] / Product Type: Commerical /    In time:***  Out time:***  Total Treatment Time (min): ***  Visit #: *** of ***    Medicare/BCBS Only   Total Timed Codes (min):  *** 1:1 Treatment Time:  ***       Treatment Area: Other low back pain [M54.59]    SUBJECTIVE  Pain Level (0-10 scale): ***  Any medication changes, allergies to medications, adverse drug reactions, diagnosis change, or new procedure performed?: [x] No    [] Yes (see summary sheet for update)  Subjective functional status/changes:   [] No changes reported  \"***\"    OBJECTIVE    Modality rationale: {[facility] INMOTION MODALITIES:57346} to improve the patients ability to perform ADLs and rest comfortably following therapy.     Min Type Additional Details   *** [] Estim:  []Unatt       []IFC  []Premod                        []Other:  []w/ice   []w/heat  Position: ***  Location: ***    [] Estim: []Att    []TENS instruct  []NMES                    []Other:  []w/US   []w/ice   []w/heat  Position:   Location:     []  Traction: [] Cervical       []Lumbar                       [] Prone          []Supine                       []Intermittent   []Continuous Lbs:  [] before manual  [] after manual    []  Ultrasound: []Continuous   [] Pulsed                           []1MHz   []3MHz W/cm2:  Location:    []  Iontophoresis with dexamethasone         Location: [] Take home patch   [] In clinic   *** []  Ice     []  heat  []  Ice massage  []  Laser   []  Anodyne Position: ***  Location: ***    []  Laser with stim  []  Other:  Position:  Location:    []  Vasopneumatic Device    []  Right     []  Left  Pre-treatment girth:  Post-treatment girth:  Measured at (location):  Pressure:       [] lo [] med [] hi   Temperature: [] lo [] med [] hi   [x] Skin assessment post-treatment:  [x]intact []redness- no adverse reaction    []redness - adverse reaction:     *** min Therapeutic Exercise:  [x] See flow sheet :   Rationale: increase ROM and increase strength to improve LE strength/mobility and  lumbar mobility to improve ease of ADLs and gait. *** min Therapeutic Activity:  [x]  See flow sheet :   Rationale: increase strength, improve coordination, improve balance, and increase proprioception  to improve the patients ability to perform transfers, stair negotiation, functional lifts, and functional carries. Pt education: ***     *** min Neuromuscular Re-education:  [x]  See flow sheet :   Rationale: increase strength, improve coordination, improve balance and increase proprioception  to improve the patients ability to perform functional tasks with improved abdominal brace utilization, improved control, and decreased risk for falls. *** min Manual Therapy:  ***   The manual therapy interventions were performed at a separate and distinct time from the therapeutic activities interventions. Rationale: decrease pain, increase ROM, increase tissue extensibility, decrease trigger points, and increase postural awareness to improve the patient's ability to complete standing ADLs with improved tolerance. *** min Gait Training:  *** feet with ***  over level surfaces with ***A. Cuing for ***.  40 feet x 2 of following-  [] March            [] Lateral                   [] Horizontal HT  [] Tandem         [] Banded Lateral     [] Vertical HT  [] Retrograde    [] Banded Monster   [] Dual Task  [] Hurdles          [] Josephus Mass                    [] Ladder Drills  [] Heel Walk      [] Toe Walk   Rationale: improve safety with household/community ambulation.             With   [] TE   [] TA   [] neuro   [] other: Patient Education: [x] Review HEP    [] Progressed/Changed HEP based on:   [] positioning   [] body mechanics   [] transfers   [] heat/ice application    [] other:      Other Objective/Functional Measures: ***     Pain Level (0-10 scale) post treatment: ***    ASSESSMENT/Changes in Function: ***    Patient will continue to benefit from skilled PT services to modify and progress therapeutic interventions, address functional mobility deficits, address ROM deficits, address strength deficits, analyze and address soft tissue restrictions, analyze and cue movement patterns, analyze and modify body mechanics/ergonomics, assess and modify postural abnormalities and address imbalance/dizziness to attain remaining goals. []  See Plan of Care  []  See progress note/recertification  []  See Discharge Summary         Progress towards goals / Updated goals:  1. Pt will increase score on the FOTO to > or = 64/100 to demo an increase in functional activity tolerance. Status at last assessment: 58  2. Pt will be able to self-manage ave pain to < or = 2/10 ave in the lumbar spine to improve ease of posture, mobility and self-care   Status at last assessment: ranges 0-10/10, average of 2-3/10  3. Pt will be able to maintain tall kneeling position for work > or = 30 minutes with no c/o pain to restore PLOF   Status at last assessment: able to maintain for 5 mins before needing to stand and stretch  4. Pt to demo glute max strength of 5/5 B without pain or l/s compensation for improved ease with squatting and climbing.    Status at last assessment: B: 4/5, pain at l/s decr with stabilizing PA glide    PLAN  [x]  Upgrade activities as tolerated     [x]  Continue plan of care  []  Update interventions per flow sheet       []  Discharge due to:_  []  Other:_      Domo Delaney 1/10/2023  10:38 AM    Future Appointments   Date Time Provider Samaria Escalante   1/10/2023  5:00 PM Samantha Skinner [facility]   1/12/2023  5:00 PM [facility] [place] [facility]   1/17/2023  5:00 PM Jalen Chin, PT [facility]   1/19/2023  5:00 PM Jalen Chin PT [facility]   1/24/2023  5:00 PM Jalen Chin, PT [facility]   1/26/2023  5:00 PM Jalen Chin, PT [facility]

## 2023-01-12 ENCOUNTER — HOSPITAL ENCOUNTER (OUTPATIENT)
Dept: PHYSICAL THERAPY | Age: 38
Discharge: HOME | End: 2023-01-12
Payer: COMMERCIAL

## 2023-01-12 PROCEDURE — 97014 ELECTRIC STIMULATION THERAPY: CPT | Performed by: PHYSICAL THERAPIST

## 2023-01-12 PROCEDURE — 97110 THERAPEUTIC EXERCISES: CPT | Performed by: PHYSICAL THERAPIST

## 2023-01-12 PROCEDURE — 97112 NEUROMUSCULAR REEDUCATION: CPT | Performed by: PHYSICAL THERAPIST

## 2023-01-12 NOTE — PROGRESS NOTES
PT DAILY TREATMENT NOTE     Patient Name: Sam Montejo  Date:2023  : 1985  [x]  Patient  Verified  Payor: Renetta Alonzo / Plan: [place] / Product Type: Commerical /    In time: 500pm  Out time: 604pm  Total Treatment Time (min): 64min  Visit #: 1 of 8    Medicare/BCBS Only   Total Timed Codes (min):   1:1 Treatment Time:         Treatment Area: Other low back pain [M54.59]    SUBJECTIVE  Pain Level (0-10 scale): 2  Any medication changes, allergies to medications, adverse drug reactions, diagnosis change, or new procedure performed?: [x] No    [] Yes (see summary sheet for update)  Subjective functional status/changes:   [] No changes reported  \"Its about the same, that same burning when I am leaning fwd. \"     OBBJECTIVE    Modality rationale: decrease pain and increase tissue extensibility to improve the patients ability to perform ADLs and rest comfortably following therapy.     Min Type Additional Details   15 + 2 [x] Estim:  [x]Unatt       [x]IFC  []Premod                        []Other:  []w/ice   [x]w/heat  Position: prone  Location: low back    [] Estim: []Att    []TENS instruct  []NMES                    []Other:  []w/US   []w/ice   []w/heat  Position:   Location:     []  Traction: [] Cervical       []Lumbar                       [] Prone          []Supine                       []Intermittent   []Continuous Lbs:  [] before manual  [] after manual    []  Ultrasound: []Continuous   [] Pulsed                           []1MHz   []3MHz W/cm2:  Location:    []  Iontophoresis with dexamethasone         Location: [] Take home patch   [] In clinic    []  Ice     []  heat  []  Ice massage  []  Laser   []  Anodyne Position:   Location:     []  Laser with stim  []  Other:  Position:  Location:    []  Vasopneumatic Device    []  Right     []  Left  Pre-treatment girth:  Post-treatment girth:  Measured at (location):  Pressure:       [] lo [] med [] hi   Temperature: [] lo [] med [] hi   [x] Skin assessment post-treatment:  [x]intact []redness- no adverse reaction    []redness - adverse reaction:     37 min Therapeutic Exercise:  [x] See flow sheet :   Rationale: increase ROM and increase strength to improve LE strength/mobility and  lumbar mobility to improve ease of ADLs and gait. 10 min Neuromuscular Re-education:  [x]  See flow sheet :   Rationale: increase strength, improve coordination, improve balance and increase proprioception  to improve the patients ability to perform functional tasks with improved abdominal brace utilization, improved control, and decreased risk for falls. held min Manual Therapy: The manual therapy interventions were performed at a separate and distinct time from the therapeutic activities interventions. Rationale: decrease pain, increase ROM, increase tissue extensibility, decrease trigger points, and increase postural awareness to improve the patient's ability to complete standing ADLs with improved tolerance. With   [] TE   [] TA   [] neuro   [] other: Patient Education: [x] Review HEP    [] Progressed/Changed HEP based on:   [] positioning   [] body mechanics   [] transfers   [] heat/ice application    [] other:      Other Objective/Functional Measures: -  Progressed to paloffs in a kneeling and half kneeling position     Pain Level (0-10 scale) post treatment: 1    ASSESSMENT/Changes in Function:  Patient reports he didn't do well with PA anant the time before last. Challenged by current program with l/s stability therex. Progressed to bridges and kneeling Paloff with good form and mod fatigue noted.     Patient will continue to benefit from skilled PT services to modify and progress therapeutic interventions, address functional mobility deficits, address ROM deficits, address strength deficits, analyze and address soft tissue restrictions, analyze and cue movement patterns, analyze and modify body mechanics/ergonomics, assess and modify postural abnormalities and address imbalance/dizziness to attain remaining goals. []  See Plan of Care  [x]  See progress note/recertification  []  See Discharge Summary         Progress towards goals / Updated goals:  New Goals to be achieved in __4__  weeks:  1. Pt will increase score on the FOTO to > or = 64/100 to demo an increase in functional activity tolerance. Status at last assessment: 58  2. Pt will be able to self-manage ave pain to < or = 2/10 ave in the lumbar spine to improve ease of posture, mobility and self-care   Status at last assessment: ranges 0-10/10, average of 2-3/10  3. Pt will be able to maintain tall kneeling position for work > or = 30 minutes with no c/o pain to restore PLOF   Status at last assessment: able to maintain for 5 mins before needing to stand and stretch  4. Pt to demo glute max strength of 5/5 B without pain or l/s compensation for improved ease with squatting and climbing.    Full bridge noted  1/12/23    [x]  Upgrade activities as tolerated     [x]  Continue plan of care  []  Update interventions per flow sheet       []  Discharge due to:_  []  Other:      Hilaria Grijalva, PT 1/12/2023  10:53 AM    Future Appointments   Date Time Provider Samaria Escalante   1/12/2023  5:00 PM [address] [place] [address] [facility]   1/17/2023  5:00 PM Solis Vivas PT [facility]   1/19/2023  5:00 PM Solis Vivas PT [facility]   1/24/2023  5:00 PM Solis Vivas PT [facility]   1/26/2023  5:00 PM Solis Vivas PT [facility]

## 2023-01-17 ENCOUNTER — HOSPITAL ENCOUNTER (OUTPATIENT)
Dept: PHYSICAL THERAPY | Age: 38
End: 2023-01-17
Payer: COMMERCIAL

## 2023-01-19 ENCOUNTER — APPOINTMENT (OUTPATIENT)
Dept: PHYSICAL THERAPY | Age: 38
End: 2023-01-19
Payer: COMMERCIAL

## 2023-01-24 ENCOUNTER — APPOINTMENT (OUTPATIENT)
Dept: PHYSICAL THERAPY | Age: 38
End: 2023-01-24
Payer: COMMERCIAL

## 2023-01-26 ENCOUNTER — APPOINTMENT (OUTPATIENT)
Dept: PHYSICAL THERAPY | Age: 38
End: 2023-01-26
Payer: COMMERCIAL

## 2023-05-26 ENCOUNTER — HOSPITAL ENCOUNTER (OUTPATIENT)
Facility: HOSPITAL | Age: 38
Discharge: HOME | End: 2023-05-26
Payer: COMMERCIAL

## 2023-05-26 DIAGNOSIS — J34.3 HYPERTROPHY, NASAL, TURBINATE: ICD-10-CM

## 2023-05-26 DIAGNOSIS — J34.89 OTHER SPECIFIED DISORDERS OF NOSE AND NASAL SINUSES: ICD-10-CM

## 2023-05-26 DIAGNOSIS — R06.5 MOUTH BREATHING: ICD-10-CM

## 2023-05-26 DIAGNOSIS — J34.2 DEVIATED NASAL SEPTUM: ICD-10-CM

## 2023-05-26 PROCEDURE — 70486 CT MAXILLOFACIAL W/O DYE: CPT

## 2023-07-28 ENCOUNTER — TELEPHONE (OUTPATIENT)
Age: 38
End: 2023-07-28

## 2023-08-14 ENCOUNTER — TELEPHONE (OUTPATIENT)
Age: 38
End: 2023-08-14

## 2023-08-16 ENCOUNTER — TELEPHONE (OUTPATIENT)
Age: 38
End: 2023-08-16